# Patient Record
Sex: FEMALE | Race: WHITE | NOT HISPANIC OR LATINO | URBAN - METROPOLITAN AREA
[De-identification: names, ages, dates, MRNs, and addresses within clinical notes are randomized per-mention and may not be internally consistent; named-entity substitution may affect disease eponyms.]

---

## 2023-04-07 PROBLEM — R41.3 MEMORY IMPAIRMENT: Status: ACTIVE | Noted: 2023-04-07

## 2023-04-07 PROBLEM — J30.9 ALLERGIC RHINITIS: Status: ACTIVE | Noted: 2023-04-07

## 2023-04-07 PROBLEM — J35.8 TONSIL STONE: Status: ACTIVE | Noted: 2023-04-07

## 2023-04-07 PROBLEM — D22.9 MULTIPLE PIGMENTED NEVI: Status: ACTIVE | Noted: 2023-04-07

## 2023-04-07 PROBLEM — B34.9 ACUTE VIRAL SYNDROME: Status: ACTIVE | Noted: 2023-04-07

## 2023-04-07 PROBLEM — U07.1 COVID-19: Status: ACTIVE | Noted: 2023-04-07

## 2023-04-07 PROBLEM — H90.3 SENSORINEURAL HEARING LOSS (SNHL) OF BOTH EARS: Status: ACTIVE | Noted: 2023-04-07

## 2023-04-07 PROBLEM — R49.0 HOARSENESS OF VOICE: Status: ACTIVE | Noted: 2023-04-07

## 2023-04-07 PROBLEM — H91.93 HEARING LOSS, BILATERAL: Status: ACTIVE | Noted: 2023-04-07

## 2023-04-07 PROBLEM — J35.03 CHRONIC ADENOTONSILLITIS: Status: ACTIVE | Noted: 2023-04-07

## 2023-04-07 PROBLEM — G89.29 CHRONIC HEADACHES: Status: ACTIVE | Noted: 2023-04-07

## 2023-04-07 PROBLEM — S39.92XA BACK INJURY: Status: ACTIVE | Noted: 2023-04-07

## 2023-04-07 PROBLEM — F17.210 CIGARETTE SMOKER: Status: ACTIVE | Noted: 2023-04-07

## 2023-04-07 PROBLEM — H92.02 OTALGIA, LEFT EAR: Status: ACTIVE | Noted: 2023-04-07

## 2023-04-07 PROBLEM — L30.9 DERMATITIS: Status: ACTIVE | Noted: 2023-04-07

## 2023-04-07 PROBLEM — R51.9 CHRONIC HEADACHES: Status: ACTIVE | Noted: 2023-04-07

## 2023-04-07 PROBLEM — H69.92 EUSTACHIAN TUBE DYSFUNCTION, LEFT: Status: ACTIVE | Noted: 2023-04-07

## 2023-04-07 PROBLEM — R42 VERTIGO: Status: ACTIVE | Noted: 2023-04-07

## 2023-04-07 PROBLEM — H93.13 BILATERAL TINNITUS: Status: ACTIVE | Noted: 2023-04-07

## 2023-04-07 PROBLEM — F41.9 ANXIETY DISORDER: Status: ACTIVE | Noted: 2023-04-07

## 2023-04-07 PROBLEM — M50.20 HERNIATED DISC, CERVICAL: Status: ACTIVE | Noted: 2023-04-07

## 2023-04-07 PROBLEM — H69.93 EUSTACHIAN TUBE DYSFUNCTION, BILATERAL: Status: ACTIVE | Noted: 2023-04-07

## 2023-04-07 PROBLEM — M54.10 RADICULITIS OF LEG: Status: ACTIVE | Noted: 2023-04-07

## 2023-04-07 PROBLEM — R01.1 SYSTOLIC MURMUR: Status: ACTIVE | Noted: 2023-04-07

## 2023-04-07 PROBLEM — J35.1 TONSILLAR HYPERTROPHY: Status: ACTIVE | Noted: 2023-04-07

## 2023-04-07 PROBLEM — S61.211A LACERATION OF LEFT INDEX FINGER: Status: ACTIVE | Noted: 2023-04-07

## 2023-04-07 PROBLEM — M54.50 LOW BACK PAIN: Status: ACTIVE | Noted: 2023-04-07

## 2023-04-07 PROBLEM — F32.A DEPRESSION, PROLONGED: Status: ACTIVE | Noted: 2023-04-07

## 2023-04-07 PROBLEM — R19.6 HALITOSIS: Status: ACTIVE | Noted: 2023-04-07

## 2023-04-07 RX ORDER — MECLIZINE HYDROCHLORIDE 25 MG/1
25 TABLET ORAL 3 TIMES DAILY PRN
COMMUNITY
Start: 2022-09-15

## 2023-04-07 RX ORDER — LORAZEPAM 0.5 MG/1
0.5 TABLET ORAL 2 TIMES DAILY
COMMUNITY
Start: 2020-04-14

## 2023-04-07 RX ORDER — ALBUTEROL SULFATE 90 UG/1
1 AEROSOL, METERED RESPIRATORY (INHALATION) EVERY 4 HOURS PRN
COMMUNITY
Start: 2022-09-06

## 2023-04-07 RX ORDER — GUAIFENESIN AND PSEUDOEPHEDRINE HCL 1200; 120 MG/1; MG/1
TABLET, EXTENDED RELEASE ORAL EVERY 12 HOURS
COMMUNITY
Start: 2022-09-16

## 2023-04-07 RX ORDER — ACETAMINOPHEN 500 MG
1 TABLET ORAL AS NEEDED
COMMUNITY

## 2023-04-11 ENCOUNTER — OFFICE VISIT (OUTPATIENT)
Dept: PRIMARY CARE | Facility: CLINIC | Age: 43
End: 2023-04-11
Payer: MEDICAID

## 2023-04-11 VITALS
RESPIRATION RATE: 16 BRPM | SYSTOLIC BLOOD PRESSURE: 117 MMHG | DIASTOLIC BLOOD PRESSURE: 80 MMHG | WEIGHT: 171 LBS | OXYGEN SATURATION: 96 % | HEART RATE: 74 BPM | BODY MASS INDEX: 30.3 KG/M2 | HEIGHT: 63 IN

## 2023-04-11 DIAGNOSIS — R01.1 SYSTOLIC MURMUR: ICD-10-CM

## 2023-04-11 DIAGNOSIS — Z02.1 PRE-EMPLOYMENT HEALTH SCREENING EXAMINATION: ICD-10-CM

## 2023-04-11 DIAGNOSIS — R51.9 CHRONIC NONINTRACTABLE HEADACHE, UNSPECIFIED HEADACHE TYPE: ICD-10-CM

## 2023-04-11 DIAGNOSIS — J45.20 MILD INTERMITTENT ASTHMA WITHOUT COMPLICATION (HHS-HCC): ICD-10-CM

## 2023-04-11 DIAGNOSIS — F17.210 CIGARETTE SMOKER: Primary | ICD-10-CM

## 2023-04-11 DIAGNOSIS — Z02.0 SCHOOL PHYSICAL EXAM: ICD-10-CM

## 2023-04-11 DIAGNOSIS — Z01.419 WELL WOMAN EXAM: ICD-10-CM

## 2023-04-11 DIAGNOSIS — M54.50 CHRONIC BILATERAL LOW BACK PAIN WITHOUT SCIATICA: ICD-10-CM

## 2023-04-11 DIAGNOSIS — G89.29 CHRONIC NONINTRACTABLE HEADACHE, UNSPECIFIED HEADACHE TYPE: ICD-10-CM

## 2023-04-11 DIAGNOSIS — G89.29 CHRONIC BILATERAL LOW BACK PAIN WITHOUT SCIATICA: ICD-10-CM

## 2023-04-11 DIAGNOSIS — Z12.31 VISIT FOR SCREENING MAMMOGRAM: ICD-10-CM

## 2023-04-11 DIAGNOSIS — I35.8 AORTIC SYSTOLIC MURMUR ON EXAMINATION: ICD-10-CM

## 2023-04-11 PROBLEM — M54.9 CHRONIC BACK PAIN: Status: ACTIVE | Noted: 2023-04-11

## 2023-04-11 PROCEDURE — 90471 IMMUNIZATION ADMIN: CPT | Performed by: INTERNAL MEDICINE

## 2023-04-11 PROCEDURE — 99396 PREV VISIT EST AGE 40-64: CPT | Performed by: INTERNAL MEDICINE

## 2023-04-11 PROCEDURE — 3008F BODY MASS INDEX DOCD: CPT | Performed by: INTERNAL MEDICINE

## 2023-04-11 PROCEDURE — 90715 TDAP VACCINE 7 YRS/> IM: CPT | Performed by: INTERNAL MEDICINE

## 2023-04-11 SDOH — ECONOMIC STABILITY: FOOD INSECURITY: WITHIN THE PAST 12 MONTHS, THE FOOD YOU BOUGHT JUST DIDN'T LAST AND YOU DIDN'T HAVE MONEY TO GET MORE.: NEVER TRUE

## 2023-04-11 SDOH — ECONOMIC STABILITY: FOOD INSECURITY: WITHIN THE PAST 12 MONTHS, YOU WORRIED THAT YOUR FOOD WOULD RUN OUT BEFORE YOU GOT MONEY TO BUY MORE.: NEVER TRUE

## 2023-04-11 ASSESSMENT — ENCOUNTER SYMPTOMS
DIFFICULTY URINATING: 0
LIGHT-HEADEDNESS: 0
DIARRHEA: 0
SLEEP DISTURBANCE: 0
FEVER: 0
SHORTNESS OF BREATH: 0
COUGH: 0
ABDOMINAL PAIN: 0
PALPITATIONS: 0
FATIGUE: 0
DECREASED CONCENTRATION: 0
NERVOUS/ANXIOUS: 0
WEAKNESS: 0
UNEXPECTED WEIGHT CHANGE: 0
FREQUENCY: 0
CHILLS: 0
VOMITING: 0
DIZZINESS: 0
NAUSEA: 0
RHINORRHEA: 0
LOSS OF SENSATION IN FEET: 0
DEPRESSION: 0
BACK PAIN: 1
OCCASIONAL FEELINGS OF UNSTEADINESS: 0
HEADACHES: 1

## 2023-04-11 ASSESSMENT — LIFESTYLE VARIABLES
HOW MANY STANDARD DRINKS CONTAINING ALCOHOL DO YOU HAVE ON A TYPICAL DAY: PATIENT DOES NOT DRINK
HOW OFTEN DO YOU HAVE A DRINK CONTAINING ALCOHOL: NEVER
AUDIT-C TOTAL SCORE: 0
HOW OFTEN DO YOU HAVE SIX OR MORE DRINKS ON ONE OCCASION: NEVER
SKIP TO QUESTIONS 9-10: 1

## 2023-04-11 ASSESSMENT — PATIENT HEALTH QUESTIONNAIRE - PHQ9
1. LITTLE INTEREST OR PLEASURE IN DOING THINGS: NOT AT ALL
SUM OF ALL RESPONSES TO PHQ9 QUESTIONS 1 & 2: 0
2. FEELING DOWN, DEPRESSED OR HOPELESS: NOT AT ALL

## 2023-04-11 NOTE — ASSESSMENT & PLAN NOTE
School physical exam completed, check routine labs, routine mammogram, T spot, rubeola antibody, varicella antigen, hepatitis B surface antibody. Schedule for routine PAP screen. Will check lipids, CMP, CBC and TSH screening also.

## 2023-04-11 NOTE — PROGRESS NOTES
"Subjective   Patient ID: Gris Dunbar is a 43 y.o. female who presents for Annual Exam.    HPI   Patient presents for physical examination.  Last blood work from 9/2022.     Tobacco use: 10 cigarettes daily.     ETOH use: Denies    anxiety disorder: Resolved. Has been treated with lorazepam to use as needed in the past during a rough time. She no longer uses it or needs it. States she is not experiencing anxiety anymore.    Chronic low back pain/ chronic headaches: Has medical marijuana card to help with the back pain and headaches. Has no issues - controlled. Patient states that she already has clearance for use when in school. Patient states that she has about 1 1/2 years to hopefully resolve the back issues / HA in an alternative way     Intermittent Asthma: Patient uses Albuterol as needed when she has cold/flu-like symptoms.     Patient has never had a mammogram but would like an order for one today. Has not had a PAP for some time. She has had a tubal ligation    Review of Systems   Constitutional:  Negative for chills, fatigue, fever and unexpected weight change.   HENT:  Negative for congestion, ear pain, hearing loss and rhinorrhea.    Eyes:  Negative for visual disturbance.   Respiratory:  Negative for cough and shortness of breath.    Cardiovascular:  Negative for chest pain, palpitations and leg swelling.   Gastrointestinal:  Negative for abdominal pain, diarrhea, nausea and vomiting.   Genitourinary:  Negative for difficulty urinating, frequency and urgency.   Musculoskeletal:  Positive for back pain (chronic).   Skin: Negative.    Neurological:  Positive for headaches (chronic). Negative for dizziness, syncope, weakness and light-headedness.   Psychiatric/Behavioral:  Negative for decreased concentration, self-injury, sleep disturbance and suicidal ideas. The patient is not nervous/anxious.        Objective   /80   Pulse 74   Resp 16   Ht 1.6 m (5' 3\")   Wt 77.6 kg (171 lb)   LMP " 03/28/2023 (Approximate)   SpO2 96%   BMI 30.29 kg/m²     Physical Exam  Vitals reviewed.   Constitutional:       Appearance: Normal appearance. She is not ill-appearing.      Comments: overweight   HENT:      Head: Normocephalic and atraumatic.      Right Ear: External ear normal.      Left Ear: External ear normal.      Nose: Nose normal.      Mouth/Throat:      Mouth: Mucous membranes are moist.      Pharynx: Oropharynx is clear.   Eyes:      Extraocular Movements: Extraocular movements intact.      Conjunctiva/sclera: Conjunctivae normal.   Cardiovascular:      Rate and Rhythm: Normal rate and regular rhythm.      Pulses: Normal pulses.      Heart sounds: Murmur heard.      Comments: Patient has a soft SM over the RUSB  Pulmonary:      Effort: Pulmonary effort is normal.      Breath sounds: Normal breath sounds.   Abdominal:      General: Abdomen is flat. There is no distension.      Palpations: Abdomen is soft.      Tenderness: There is no abdominal tenderness. There is no guarding or rebound.   Musculoskeletal:         General: Normal range of motion.      Cervical back: Normal range of motion and neck supple.      Right lower leg: No edema.      Left lower leg: No edema.   Skin:     General: Skin is warm and dry.      Findings: No rash (on exposed skin).   Neurological:      General: No focal deficit present.      Mental Status: She is alert and oriented to person, place, and time. Mental status is at baseline.      Motor: No weakness.   Psychiatric:         Mood and Affect: Mood normal.         Behavior: Behavior normal.         Thought Content: Thought content normal.         Judgment: Judgment normal.         Assessment/Plan   Problem List Items Addressed This Visit          Nervous    Chronic headaches       Respiratory    Mild intermittent asthma without complication     Continue albuterol inhaler as needed.            Circulatory    Systolic murmur     Check an ECHO cardiogram             Endocrine/Metabolic    Adult BMI 30.0-30.9 kg/sq m       Other    Cigarette smoker - Primary    Chronic back pain    Pre-employment health screening examination     School physical exam completed, check routine labs, routine mammogram, T spot, rubeola antibody, varicella antigen, hepatitis B surface antibody. Schedule for routine PAP screen. Will check lipids, CMP, CBC and TSH screening also.          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

## 2023-04-18 RX ORDER — LORAZEPAM 0.5 MG/1
0.5 TABLET ORAL EVERY 6 HOURS PRN
OUTPATIENT
Start: 2023-04-18

## 2023-04-18 NOTE — TELEPHONE ENCOUNTER
Pt has been experiencing severe anxiety these past couple weeks. She says she takes Lorazepam as needed and would like to have it called in.  [facility] [place].

## 2023-04-19 NOTE — TELEPHONE ENCOUNTER
Patient requests lorazepam, reviewed [facility] report. No lorazepam prescribed in [facility] report. Multiple fills of medical marijuana noted. Patient noted at last visit that she does not use lorazepam any longer.

## 2023-04-19 NOTE — TELEPHONE ENCOUNTER
Spoke with patient , she was given information regarding needing an appointment.   She was routed to  to make an appointment , next appointment opening wasn't until June. Patient got upset , not understanding why she would need an appointment and hung up the phone on  staff.

## 2023-04-25 ENCOUNTER — LAB (OUTPATIENT)
Dept: LAB | Facility: LAB | Age: 43
End: 2023-04-25
Payer: MEDICAID

## 2023-04-25 DIAGNOSIS — Z02.0 SCHOOL PHYSICAL EXAM: ICD-10-CM

## 2023-04-25 DIAGNOSIS — G89.29 CHRONIC NONINTRACTABLE HEADACHE, UNSPECIFIED HEADACHE TYPE: ICD-10-CM

## 2023-04-25 DIAGNOSIS — R01.1 SYSTOLIC MURMUR: ICD-10-CM

## 2023-04-25 DIAGNOSIS — Z02.1 PRE-EMPLOYMENT HEALTH SCREENING EXAMINATION: ICD-10-CM

## 2023-04-25 DIAGNOSIS — R51.9 CHRONIC NONINTRACTABLE HEADACHE, UNSPECIFIED HEADACHE TYPE: ICD-10-CM

## 2023-04-25 DIAGNOSIS — G89.29 CHRONIC BILATERAL LOW BACK PAIN WITHOUT SCIATICA: ICD-10-CM

## 2023-04-25 DIAGNOSIS — M54.50 CHRONIC BILATERAL LOW BACK PAIN WITHOUT SCIATICA: ICD-10-CM

## 2023-04-25 DIAGNOSIS — I35.8 AORTIC SYSTOLIC MURMUR ON EXAMINATION: ICD-10-CM

## 2023-04-25 DIAGNOSIS — F17.210 CIGARETTE SMOKER: ICD-10-CM

## 2023-04-25 DIAGNOSIS — Z12.31 VISIT FOR SCREENING MAMMOGRAM: ICD-10-CM

## 2023-04-25 DIAGNOSIS — Z01.419 WELL WOMAN EXAM: ICD-10-CM

## 2023-04-25 DIAGNOSIS — J45.20 MILD INTERMITTENT ASTHMA WITHOUT COMPLICATION (HHS-HCC): ICD-10-CM

## 2023-04-25 LAB
ALANINE AMINOTRANSFERASE (SGPT) (U/L) IN SER/PLAS: 12 U/L (ref 7–45)
ALBUMIN (G/DL) IN SER/PLAS: 3.9 G/DL (ref 3.4–5)
ALKALINE PHOSPHATASE (U/L) IN SER/PLAS: 38 U/L (ref 33–110)
ANION GAP IN SER/PLAS: 9 MMOL/L (ref 10–20)
ASPARTATE AMINOTRANSFERASE (SGOT) (U/L) IN SER/PLAS: 14 U/L (ref 9–39)
BASOPHILS (10*3/UL) IN BLOOD BY AUTOMATED COUNT: 0.06 X10E9/L (ref 0–0.1)
BASOPHILS/100 LEUKOCYTES IN BLOOD BY AUTOMATED COUNT: 1 % (ref 0–2)
BILIRUBIN TOTAL (MG/DL) IN SER/PLAS: 1 MG/DL (ref 0–1.2)
C REACTIVE PROTEIN (MG/L) IN SER/PLAS: 0.16 MG/DL
CALCIDIOL (25 OH VITAMIN D3) (NG/ML) IN SER/PLAS: 22 NG/ML
CALCIUM (MG/DL) IN SER/PLAS: 8.5 MG/DL (ref 8.6–10.3)
CARBON DIOXIDE, TOTAL (MMOL/L) IN SER/PLAS: 27 MMOL/L (ref 21–32)
CHLORIDE (MMOL/L) IN SER/PLAS: 107 MMOL/L (ref 98–107)
CHOLESTEROL (MG/DL) IN SER/PLAS: 142 MG/DL (ref 0–199)
CHOLESTEROL IN HDL (MG/DL) IN SER/PLAS: 63.8 MG/DL
CHOLESTEROL/HDL RATIO: 2.2
CREATININE (MG/DL) IN SER/PLAS: 0.6 MG/DL (ref 0.5–1.05)
EOSINOPHILS (10*3/UL) IN BLOOD BY AUTOMATED COUNT: 0.21 X10E9/L (ref 0–0.7)
EOSINOPHILS/100 LEUKOCYTES IN BLOOD BY AUTOMATED COUNT: 3.5 % (ref 0–6)
ERYTHROCYTE DISTRIBUTION WIDTH (RATIO) BY AUTOMATED COUNT: 13.9 % (ref 11.5–14.5)
ERYTHROCYTE MEAN CORPUSCULAR HEMOGLOBIN CONCENTRATION (G/DL) BY AUTOMATED: 31.4 G/DL (ref 32–36)
ERYTHROCYTE MEAN CORPUSCULAR VOLUME (FL) BY AUTOMATED COUNT: 92 FL (ref 80–100)
ERYTHROCYTES (10*6/UL) IN BLOOD BY AUTOMATED COUNT: 3.89 X10E12/L (ref 4–5.2)
GFR FEMALE: >90 ML/MIN/1.73M2
GLUCOSE (MG/DL) IN SER/PLAS: 75 MG/DL (ref 74–99)
HEMATOCRIT (%) IN BLOOD BY AUTOMATED COUNT: 35.7 % (ref 36–46)
HEMOGLOBIN (G/DL) IN BLOOD: 11.2 G/DL (ref 12–16)
IMMATURE GRANULOCYTES/100 LEUKOCYTES IN BLOOD BY AUTOMATED COUNT: 0.2 % (ref 0–0.9)
LDL: 64 MG/DL (ref 0–99)
LEUKOCYTES (10*3/UL) IN BLOOD BY AUTOMATED COUNT: 6.1 X10E9/L (ref 4.4–11.3)
LYMPHOCYTES (10*3/UL) IN BLOOD BY AUTOMATED COUNT: 2.44 X10E9/L (ref 1.2–4.8)
LYMPHOCYTES/100 LEUKOCYTES IN BLOOD BY AUTOMATED COUNT: 40.3 % (ref 13–44)
MONOCYTES (10*3/UL) IN BLOOD BY AUTOMATED COUNT: 0.35 X10E9/L (ref 0.1–1)
MONOCYTES/100 LEUKOCYTES IN BLOOD BY AUTOMATED COUNT: 5.8 % (ref 2–10)
NEUTROPHILS (10*3/UL) IN BLOOD BY AUTOMATED COUNT: 2.99 X10E9/L (ref 1.2–7.7)
NEUTROPHILS/100 LEUKOCYTES IN BLOOD BY AUTOMATED COUNT: 49.2 % (ref 40–80)
PLATELETS (10*3/UL) IN BLOOD AUTOMATED COUNT: 230 X10E9/L (ref 150–450)
POTASSIUM (MMOL/L) IN SER/PLAS: 4.1 MMOL/L (ref 3.5–5.3)
PROTEIN TOTAL: 5.7 G/DL (ref 6.4–8.2)
RHEUMATOID FACTOR (IU/ML) IN SERUM OR PLASMA: <10 IU/ML (ref 0–15)
SEDIMENTATION RATE, ERYTHROCYTE: 1 MM/H (ref 0–20)
SODIUM (MMOL/L) IN SER/PLAS: 139 MMOL/L (ref 136–145)
THYROTROPIN (MIU/L) IN SER/PLAS BY DETECTION LIMIT <= 0.05 MIU/L: 1.26 MIU/L (ref 0.44–3.98)
TRIGLYCERIDE (MG/DL) IN SER/PLAS: 71 MG/DL (ref 0–149)
UREA NITROGEN (MG/DL) IN SER/PLAS: 10 MG/DL (ref 6–23)
VLDL: 14 MG/DL (ref 0–40)

## 2023-04-25 PROCEDURE — 86787 VARICELLA-ZOSTER ANTIBODY: CPT

## 2023-04-25 PROCEDURE — 86706 HEP B SURFACE ANTIBODY: CPT

## 2023-04-25 PROCEDURE — 85025 COMPLETE CBC W/AUTO DIFF WBC: CPT

## 2023-04-25 PROCEDURE — 80061 LIPID PANEL: CPT

## 2023-04-25 PROCEDURE — 80053 COMPREHEN METABOLIC PANEL: CPT

## 2023-04-25 PROCEDURE — 84443 ASSAY THYROID STIM HORMONE: CPT

## 2023-04-25 PROCEDURE — 86481 TB AG RESPONSE T-CELL SUSP: CPT

## 2023-04-25 PROCEDURE — 86765 RUBEOLA ANTIBODY: CPT

## 2023-04-25 PROCEDURE — 82306 VITAMIN D 25 HYDROXY: CPT

## 2023-04-25 PROCEDURE — 36415 COLL VENOUS BLD VENIPUNCTURE: CPT

## 2023-04-26 LAB
ANTI-CENTROMERE: <0.2 AI
ANTI-CHROMATIN: <0.2 AI
ANTI-DNA (DS): <1 IU/ML
ANTI-JO-1 IGG: <0.2 AI
ANTI-NUCLEAR ANTIBODY (ANA): NEGATIVE
ANTI-RIBOSOMAL P: <0.2 AI
ANTI-RNP: 0.4 AI
ANTI-SCL-70: <0.2 AI
ANTI-SM/RNP: <0.2 AI
ANTI-SM: <0.2 AI
ANTI-SSA: <0.2 AI
ANTI-SSB: <0.2 AI
HEPATITIS B VIRUS SURFACE AB (MIU/ML) IN SERUM: <3.1 MIU/ML
RUBEOLA IGG ANTIBODY: POSITIVE
VARICELLA ZOSTER IGG: POSITIVE

## 2023-04-28 ENCOUNTER — TELEPHONE (OUTPATIENT)
Dept: PRIMARY CARE | Facility: CLINIC | Age: 43
End: 2023-04-28
Payer: MEDICAID

## 2023-04-28 LAB
NIL(NEG) CONTROL SPOT COUNT: NORMAL
PANEL A SPOT COUNT: 2
PANEL B SPOT COUNT: 0
POS CONTROL SPOT COUNT: NORMAL
T-SPOT. TB INTERPRETATION: NEGATIVE

## 2023-10-09 PROBLEM — R41.0 CONFUSION: Status: ACTIVE | Noted: 2023-10-09

## 2023-10-09 RX ORDER — IBUPROFEN 800 MG/1
1 TABLET ORAL EVERY 6 HOURS PRN
COMMUNITY
Start: 2023-04-19

## 2023-10-09 RX ORDER — ESCITALOPRAM OXALATE 5 MG/1
1 TABLET ORAL DAILY
COMMUNITY
Start: 2023-04-20